# Patient Record
Sex: FEMALE | Race: ASIAN | NOT HISPANIC OR LATINO | Employment: FULL TIME | URBAN - METROPOLITAN AREA
[De-identification: names, ages, dates, MRNs, and addresses within clinical notes are randomized per-mention and may not be internally consistent; named-entity substitution may affect disease eponyms.]

---

## 2017-01-19 ENCOUNTER — OFFICE VISIT (OUTPATIENT)
Dept: OBSTETRICS AND GYNECOLOGY | Age: 32
End: 2017-01-19

## 2017-01-19 VITALS
SYSTOLIC BLOOD PRESSURE: 124 MMHG | DIASTOLIC BLOOD PRESSURE: 60 MMHG | HEIGHT: 66 IN | WEIGHT: 146.8 LBS | BODY MASS INDEX: 23.59 KG/M2

## 2017-01-19 DIAGNOSIS — Z12.4 ROUTINE CERVICAL SMEAR: Primary | ICD-10-CM

## 2017-01-19 DIAGNOSIS — Z00.00 ANNUAL PHYSICAL EXAM: ICD-10-CM

## 2017-01-19 DIAGNOSIS — Z11.51 SPECIAL SCREENING EXAMINATION FOR HUMAN PAPILLOMAVIRUS (HPV): ICD-10-CM

## 2017-01-19 PROCEDURE — 99385 PREV VISIT NEW AGE 18-39: CPT | Performed by: OBSTETRICS & GYNECOLOGY

## 2017-01-19 NOTE — MR AVS SNAPSHOT
"                        Lyly Domingo   1/19/2017 1:30 PM   Office Visit    Dept Phone:  [phone]   Encounter #:  [id number]    Provider:  Chandrakant Sagastume MD   Department:  [facility]                Your Full Care Plan              Your Updated Medication List      Notice  As of 1/19/2017  2:41 PM    You have not been prescribed any medications.            We Performed the Following     IGP, Apt HPV,rfx 16 / 18,45       You Were Diagnosed With        Codes Comments    Routine cervical smear    -  Primary ICD-10-CM: Z12.4  ICD-9-CM: V76.2     Special screening examination for human papillomavirus (HPV)     ICD-10-CM: Z11.51  ICD-9-CM: V73.81     Annual physical exam     ICD-10-CM: Z00.00  ICD-9-CM: V70.0       Instructions     None    Patient Instructions History      Upcoming Appointments     Visit Type Date Time Department    NEW PATIENT 1/19/2017  1:30 PM [facility]      [employer] Signup     Our records indicate that your [employer] account has been deactivated. If you would like to reactivate your account, please email [email or web address] or call [phone] to talk to our [employer] staff.             Other Info from Your Visit           Allergies     No Known Allergies      Reason for Visit     Gynecologic Exam New pt      Vital Signs     Blood Pressure Height Weight Last Menstrual Period Body Mass Index Smoking Status    124/60 66\" (167.6 cm) 146 lb 12.8 oz (66.6 kg) 01/03/2017 23.69 kg/m2 Never Smoker      Problems and Diagnoses Noted     Routine cervical smear    -  Primary    Screening for HPV (human papillomavirus)        Annual physical exam            "

## 2017-01-19 NOTE — PROGRESS NOTES
Subjective   Lyly Domingo is a 31 y.o. female is being seen today for AnnuaL Exam  Chief Complaint   Patient presents with   • Gynecologic Exam     New pt   .    History of Present Illness  Patient is here as a new patient to this office [facility] first time in 4 or 5 years.  She has 2 sons 8 and 5 and has not been seen since the last birth.  Her cycles are regular maybe 34 days late but that's her pattern otherwise there are okay.  She is using condoms for birth control again status a K but it did discuss all the different types of protection and she will think about that and I gave her couple brochures.  She's full-time working so she really probably does not want anymore children although her parents like another one.  Vital history no allergies only medicines of vitamins no surgery no medical problems family history father had diabetes and blood pressure and had some type of cancer she does not smoke really has no other complaints  The following portions of the patient's history were reviewed and updated as appropriate: allergies, current medications, past family history, past medical history, past social history, past surgical history and problem list.    PAST MEDICAL HISTORY  History reviewed. No pertinent past medical history.  OB History     No data available        History reviewed. No pertinent past surgical history.  Family History   Problem Relation Age of Onset   • Hypertension Father      History   Smoking Status   • Never Smoker   Smokeless Tobacco   • Not on file     No current outpatient prescriptions on file.    There is no immunization history on file for this patient.    Review of Systems   Constitutional: Negative for chills, fatigue, fever and unexpected weight change.   Respiratory: Negative for shortness of breath and wheezing.    Cardiovascular: Negative for chest pain.   Gastrointestinal: Negative for abdominal distention, abdominal pain, blood in stool, constipation, diarrhea and nausea.    Genitourinary: Negative for difficulty urinating, dyspareunia, dysuria, frequency, hematuria, menstrual problem, pelvic pain, urgency and vaginal discharge.   Skin: Negative for rash.       Objective   Physical Exam   Constitutional: She is oriented to person, place, and time. Vital signs are normal. She appears well-developed and well-nourished.   Neck: No thyromegaly present.   Cardiovascular: Normal rate, regular rhythm and normal heart sounds.    Pulmonary/Chest: Effort normal. Right breast exhibits no inverted nipple, no mass, no nipple discharge, no skin change and no tenderness. Left breast exhibits no inverted nipple, no mass, no nipple discharge, no skin change and no tenderness. Breasts are symmetrical. There is no breast swelling.   Abdominal: Soft.   Genitourinary: Vagina normal and uterus normal. No breast tenderness, discharge or bleeding. Pelvic exam was performed with patient supine. No labial fusion. There is no rash, tenderness, lesion or injury on the right labia. There is no rash, tenderness, lesion or injury on the left labia. Cervix exhibits no motion tenderness, no discharge and no friability. Right adnexum displays no mass, no tenderness and no fullness. Left adnexum displays no mass, no tenderness and no fullness.   Neurological: She is alert and oriented to person, place, and time.   Psychiatric: She has a normal mood and affect.   Vitals reviewed.        Assessment/Plan   Lyly was seen today for gynecologic exam.    Diagnoses and all orders for this visit:    Routine cervical smear  -     IGP, Apt HPV,rfx 16 / 18,45    Special screening examination for human papillomavirus (HPV)  -     IGP, Apt HPV,rfx 16 / 18,45    Annual physical exam    Normal exam today.  Pap smear done today.  Come back in year call if would desire to try any type of birth control.  She tried one pill and became dizzy with that and stopped it.  She does not remember what that pill was

## 2017-01-24 LAB
CYTOLOGIST CVX/VAG CYTO: NORMAL
CYTOLOGY CVX/VAG DOC THIN PREP: NORMAL
DX ICD CODE: NORMAL
HIV 1 & 2 AB SER-IMP: NORMAL
HPV I/H RISK 4 DNA CVX QL PROBE+SIG AMP: NEGATIVE
OTHER STN SPEC: NORMAL
PATH REPORT.FINAL DX SPEC: NORMAL
STAT OF ADQ CVX/VAG CYTO-IMP: NORMAL

## 2017-02-23 ENCOUNTER — TELEPHONE (OUTPATIENT)
Dept: OBSTETRICS AND GYNECOLOGY | Age: 32
End: 2017-02-23

## 2017-02-23 NOTE — TELEPHONE ENCOUNTER
----- Message from Yaa Zhang sent at 2/23/2017  4:09 PM EST -----  Mitesh pt    Pt has decided she wants Dr. JEFFREY to call her in some B/C.  She is wanting to try a progesterone only pill.     Pharm in chart.     Pt#- [phone]    Pt called again today bc Dr. JEFFREY didn't send anything in.

## 2017-02-24 RX ORDER — ACETAMINOPHEN AND CODEINE PHOSPHATE 120; 12 MG/5ML; MG/5ML
1 SOLUTION ORAL DAILY
Qty: 28 TABLET | Refills: 12 | Status: SHIPPED | OUTPATIENT
Start: 2017-02-24 | End: 2018-02-24